# Patient Record
Sex: FEMALE | Race: WHITE | NOT HISPANIC OR LATINO | Employment: OTHER | URBAN - NONMETROPOLITAN AREA
[De-identification: names, ages, dates, MRNs, and addresses within clinical notes are randomized per-mention and may not be internally consistent; named-entity substitution may affect disease eponyms.]

---

## 2017-12-13 ENCOUNTER — TRANSCRIBE ORDERS (OUTPATIENT)
Dept: GENERAL RADIOLOGY | Facility: CLINIC | Age: 82
End: 2017-12-13

## 2017-12-13 DIAGNOSIS — R09.89 OTHER SPECIFIED SYMPTOMS AND SIGNS INVOLVING THE CIRCULATORY AND RESPIRATORY SYSTEMS: Primary | ICD-10-CM

## 2018-02-12 ENCOUNTER — OFFICE VISIT (OUTPATIENT)
Dept: CARDIAC SURGERY | Facility: CLINIC | Age: 83
End: 2018-02-12

## 2018-02-12 ENCOUNTER — LAB (OUTPATIENT)
Dept: LAB | Facility: OTHER | Age: 83
End: 2018-02-12

## 2018-02-12 VITALS
WEIGHT: 113.4 LBS | BODY MASS INDEX: 19.36 KG/M2 | OXYGEN SATURATION: 98 % | HEIGHT: 64 IN | HEART RATE: 99 BPM | DIASTOLIC BLOOD PRESSURE: 84 MMHG | SYSTOLIC BLOOD PRESSURE: 181 MMHG

## 2018-02-12 DIAGNOSIS — I65.23 BILATERAL CAROTID ARTERY STENOSIS: ICD-10-CM

## 2018-02-12 DIAGNOSIS — I10 ESSENTIAL HYPERTENSION: ICD-10-CM

## 2018-02-12 DIAGNOSIS — E78.2 MIXED HYPERLIPIDEMIA: ICD-10-CM

## 2018-02-12 DIAGNOSIS — G45.1 HEMISPHERIC CAROTID ARTERY SYNDROME: ICD-10-CM

## 2018-02-12 DIAGNOSIS — I65.23 BILATERAL CAROTID ARTERY STENOSIS: Primary | ICD-10-CM

## 2018-02-12 DIAGNOSIS — G25.81 RESTLESS LEGS: ICD-10-CM

## 2018-02-12 PROBLEM — E78.5 HYPERLIPIDEMIA: Status: ACTIVE | Noted: 2018-02-12

## 2018-02-12 LAB
ANION GAP SERPL CALCULATED.3IONS-SCNC: 9 MMOL/L (ref 5–15)
BUN BLD-MCNC: 15 MG/DL (ref 8–25)
BUN/CREAT SERPL: 21.4 (ref 7–25)
CALCIUM SPEC-SCNC: 9.6 MG/DL (ref 8.4–10.8)
CHLORIDE SERPL-SCNC: 107 MMOL/L (ref 100–112)
CO2 SERPL-SCNC: 27 MMOL/L (ref 20–32)
CREAT BLD-MCNC: 0.7 MG/DL (ref 0.4–1.3)
GFR SERPL CREATININE-BSD FRML MDRD: 79 ML/MIN/1.73 (ref 39–90)
GLUCOSE BLD-MCNC: 137 MG/DL (ref 70–100)
POTASSIUM BLD-SCNC: 4.1 MMOL/L (ref 3.4–5.4)
SODIUM BLD-SCNC: 143 MMOL/L (ref 134–146)

## 2018-02-12 PROCEDURE — 36415 COLL VENOUS BLD VENIPUNCTURE: CPT | Performed by: THORACIC SURGERY (CARDIOTHORACIC VASCULAR SURGERY)

## 2018-02-12 PROCEDURE — 99204 OFFICE O/P NEW MOD 45 MIN: CPT | Performed by: THORACIC SURGERY (CARDIOTHORACIC VASCULAR SURGERY)

## 2018-02-12 PROCEDURE — 80048 BASIC METABOLIC PNL TOTAL CA: CPT | Performed by: THORACIC SURGERY (CARDIOTHORACIC VASCULAR SURGERY)

## 2018-02-12 RX ORDER — PRAVASTATIN SODIUM 40 MG
40 TABLET ORAL DAILY
COMMUNITY

## 2018-02-12 RX ORDER — LATANOPROST 50 UG/ML
1 SOLUTION/ DROPS OPHTHALMIC NIGHTLY
COMMUNITY

## 2018-02-12 RX ORDER — METOPROLOL SUCCINATE 25 MG/1
25 TABLET, EXTENDED RELEASE ORAL DAILY
COMMUNITY

## 2018-02-12 RX ORDER — AMLODIPINE BESYLATE 5 MG/1
5 TABLET ORAL DAILY
COMMUNITY

## 2018-02-12 RX ORDER — BRIMONIDINE TARTRATE AND TIMOLOL MALEATE 2; 5 MG/ML; MG/ML
1 SOLUTION OPHTHALMIC EVERY 12 HOURS
COMMUNITY

## 2018-02-12 NOTE — PROGRESS NOTES
2/12/2018    Irais Barker  12/14/1931    Chief Complaint:    Chief Complaint   Patient presents with   • Carotid Artery Disease       HPI:      PCP:  MADELIN Hernandez    86 y.o. female with HTN(uncontrolled, increased risk stroke), Hyperlipidemia(stable, increased risk cardiovascular events), Carotid Stenosis(new, increased risk stroke), Vitamin D deficiency(stable, increased risk cardiovascular events).  never smoked.  Brief episodes moderate to severe slurred speech x3 lasting few minutes, 2015 and around thanksgiving. Bruit noted on exam with primary care. Vision changes, blurry with cornea and glaucoma.  History stroke mother, father.  No stroke amaurosis.  No MI claudication. No other associated signs, symptoms or modifying factors.    8/2015 CXR:  Mild LEFT lower lobe atelectasis  1/3/2018 Carotid Duplex:  RUDY 70-99% (261/65cm/s, ratio 2.7) antegrade vert.  LICA 50-69% (0-49% by SRU criteria) (114/27cm/s, ratio 1.1)    The following portions of the patient's history were reviewed and updated as appropriate: allergies, current medications, past family history, past medical history, past social history, past surgical history and problem list.  Recent images independently reviewed.  Available laboratory values reviewed.    PMH:  Past Medical History:   Diagnosis Date   • Arthropathy    • Degeneration of macula and posterior pole of retina    • Herpes zoster    • Hyperlipidemia    • Hypertension    • Osteopenia    • Restless legs        ALLERGIES:  Allergies   Allergen Reactions   • Naprosyn [Naproxen]          MEDICATIONS:    Current Outpatient Prescriptions:   •  amLODIPine (NORVASC) 5 MG tablet, Take 5 mg by mouth Daily., Disp: , Rfl:   •  brimonidine-timolol (COMBIGAN) 0.2-0.5 % ophthalmic solution, 1 drop Every 12 (Twelve) Hours., Disp: , Rfl:   •  latanoprost (XALATAN) 0.005 % ophthalmic solution, 1 drop Every Night., Disp: , Rfl:   •  metoprolol succinate XL (TOPROL-XL) 25 MG 24 hr tablet, Take 25 mg  by mouth Daily., Disp: , Rfl:   •  pravastatin (PRAVACHOL) 40 MG tablet, Take 40 mg by mouth Daily., Disp: , Rfl:     Review of Systems   Review of Systems   Constitution: Negative for malaise/fatigue, night sweats and weight loss.   HENT: Positive for hearing loss. Negative for hoarse voice and stridor.    Eyes: Positive for blurred vision and visual disturbance. Negative for vision loss in left eye and vision loss in right eye.   Cardiovascular: Negative for chest pain, leg swelling and palpitations.   Respiratory: Negative for cough, hemoptysis and shortness of breath.    Hematologic/Lymphatic: Negative for adenopathy and bleeding problem. Does not bruise/bleed easily.   Skin: Negative for color change, poor wound healing and rash.   Musculoskeletal: Positive for arthritis and joint pain. Negative for back pain, muscle weakness and neck pain.   Gastrointestinal: Negative for abdominal pain, dysphagia and heartburn.   Neurological: Negative for dizziness, numbness and seizures.        Slurring speech   Psychiatric/Behavioral: Negative for altered mental status, depression and memory loss. The patient is not nervous/anxious.        Physical Exam   Physical Exam   Constitutional: She is oriented to person, place, and time. She is active and cooperative. She does not appear ill. No distress.   HENT:   Head: Atraumatic.   Right Ear: Hearing normal.   Left Ear: Hearing normal.   Nose: No nasal deformity. No epistaxis.   Mouth/Throat: She does not have dentures. Normal dentition.   Eyes: Conjunctivae and lids are normal. Right pupil is round and reactive. Left pupil is round and reactive.   Neck: No JVD present. Carotid bruit is not present. No tracheal deviation present. No thyroid mass and no thyromegaly present.   Cardiovascular: Normal rate and regular rhythm.    No murmur heard.  Pulses:       Carotid pulses are 2+ on the right side with bruit, and 2+ on the left side.       Radial pulses are 2+ on the right side,  and 2+ on the left side.        Dorsalis pedis pulses are 2+ on the right side, and 2+ on the left side.   Pulmonary/Chest: Effort normal and breath sounds normal.   Abdominal: Soft. She exhibits no distension and no mass. There is no splenomegaly or hepatomegaly. There is no tenderness.   Musculoskeletal: She exhibits no deformity.   Gait normal.    Lymphadenopathy:     She has no cervical adenopathy.        Right: No supraclavicular adenopathy present.        Left: No supraclavicular adenopathy present.   Neurological: She is alert and oriented to person, place, and time. She has normal strength.   Skin: Skin is warm and dry. No cyanosis or erythema. No pallor.   No venous staining   Psychiatric: She has a normal mood and affect. Her speech is normal. Judgment and thought content normal.     Results for LETAANIBAL SERRANONASH SNIDER (MRN [id number]) as of 2/12/2018 16:41   Ref. Range 2/12/2018 15:16   Creatinine Latest Ref Range: 0.40 - 1.30 mg/dL 0.70   BUN Latest Ref Range: 8 - 25 mg/dL 15     ASSESSMENT:  Shira was seen today for carotid artery disease.    Diagnoses and all orders for this visit:    Bilateral carotid artery stenosis  -     CT Angiogram Carotids; Future  -     Basic Metabolic Panel; Future    Hemispheric carotid artery syndrome    Essential hypertension    Mixed hyperlipidemia    Restless legs    PLAN:  Detailed discussion with Shira Barker regarding situation and options.  New carotid stenosis, symptomatic.  Multiple risk factors with severe comorbidities.  Additional imaging required for evaluation location and extent of carotid stenosis.  Risks, benefits discussed.  Understands and wishes to proceed with plan.    Aspirin 81mg daily  CTA Carotids 2/19/2018    Return after above studies complete  Recommended regular physical activity, progressive walking program.  Continue current medications as directed.  Will Obtain relevant old records.    Thank you for the opportunity to participate in this  patient's care.    Copy to primary care provider.    EMR Dragon/Transcription disclaimer:   Much of this encounter note is an electronic transcription/translation of spoken language to printed text. The electronic translation of spoken language may permit erroneous, or at times, nonsensical words or phrases to be inadvertently transcribed; Although I have reviewed the note for such errors, some may still exist.

## 2018-02-19 ENCOUNTER — HOSPITAL ENCOUNTER (OUTPATIENT)
Dept: CT IMAGING | Facility: HOSPITAL | Age: 83
Discharge: HOME | End: 2018-02-19
Admitting: THORACIC SURGERY (CARDIOTHORACIC VASCULAR SURGERY)

## 2018-02-19 DIAGNOSIS — I65.23 BILATERAL CAROTID ARTERY STENOSIS: ICD-10-CM

## 2018-02-19 PROCEDURE — 0 IOPAMIDOL PER 1 ML: Performed by: THORACIC SURGERY (CARDIOTHORACIC VASCULAR SURGERY)

## 2018-02-19 PROCEDURE — 70498 CT ANGIOGRAPHY NECK: CPT

## 2018-02-19 RX ADMIN — IOPAMIDOL 65 ML: 755 INJECTION, SOLUTION INTRAVENOUS at 13:00

## 2018-03-26 ENCOUNTER — OFFICE VISIT (OUTPATIENT)
Dept: CARDIAC SURGERY | Facility: CLINIC | Age: 83
End: 2018-03-26

## 2018-03-26 VITALS
DIASTOLIC BLOOD PRESSURE: 82 MMHG | WEIGHT: 113.6 LBS | OXYGEN SATURATION: 98 % | HEIGHT: 63 IN | SYSTOLIC BLOOD PRESSURE: 152 MMHG | BODY MASS INDEX: 20.13 KG/M2 | HEART RATE: 94 BPM

## 2018-03-26 DIAGNOSIS — I65.23 BILATERAL CAROTID ARTERY STENOSIS: Primary | ICD-10-CM

## 2018-03-26 DIAGNOSIS — I10 ESSENTIAL HYPERTENSION: ICD-10-CM

## 2018-03-26 DIAGNOSIS — G45.1 HEMISPHERIC CAROTID ARTERY SYNDROME: ICD-10-CM

## 2018-03-26 DIAGNOSIS — I65.29 OCCLUSION AND STENOSIS OF CAROTID ARTERY: ICD-10-CM

## 2018-03-26 DIAGNOSIS — E78.2 MIXED HYPERLIPIDEMIA: ICD-10-CM

## 2018-03-26 PROCEDURE — 99214 OFFICE O/P EST MOD 30 MIN: CPT | Performed by: THORACIC SURGERY (CARDIOTHORACIC VASCULAR SURGERY)

## 2018-03-26 RX ORDER — SODIUM CHLORIDE 9 MG/ML
100 INJECTION, SOLUTION INTRAVENOUS CONTINUOUS
Status: CANCELLED | OUTPATIENT
Start: 2018-04-13

## 2018-03-26 RX ORDER — ASPIRIN 81 MG/1
81 TABLET, CHEWABLE ORAL DAILY
COMMUNITY

## 2018-04-06 NOTE — PROGRESS NOTES
3/26/2018    Irais Barker  12/14/1931    Chief Complaint:  Carotid Stenosis      HPI:      PCP:  Jordi Marcos, MADELIN  86 y.o. female with HTN(uncontrolled, increased risk stroke), Hyperlipidemia(stable, increased risk cardiovascular events), Carotid Stenosis(new, increased risk stroke), Vitamin D deficiency(stable, increased risk cardiovascular events).  never smoked.  Brief episodes moderate to severe slurred speech x3 lasting few minutes, 2015 and around thanksgiving. Bruit noted on exam with primary care. Vision changes, blurry with cornea and glaucoma.  History stroke mother, father.  No stroke amaurosis.  No MI claudication. No other associated signs, symptoms or modifying factors.     8/2015 CXR:  Mild LEFT lower lobe atelectasis  1/3/2018 Carotid Duplex:  RUDY 70-99% (261/65cm/s, ratio 2.7) antegrade vert.  LICA 50-69% (0-49% by SRU criteria) (114/27cm/s, ratio 1.1)  2/19/2018 CTA Carotids:  RUDY 70% with large ulcerated plaque, LICA mild plaque.    The following portions of the patient's history were reviewed and updated as appropriate: allergies, current medications, past family history, past medical history, past social history, past surgical history and problem list.  Recent images independently reviewed.  Available laboratory values reviewed.    PMH:  Past Medical History:   Diagnosis Date   • Arthropathy    • Degeneration of macula and posterior pole of retina    • Herpes zoster    • Hyperlipidemia    • Hypertension    • Osteopenia    • Restless legs        ALLERGIES:  Allergies   Allergen Reactions   • Naprosyn [Naproxen]          MEDICATIONS:    Current Outpatient Prescriptions:   •  amLODIPine (NORVASC) 5 MG tablet, Take 5 mg by mouth Daily., Disp: , Rfl:   •  aspirin 81 MG chewable tablet, Chew 81 mg Daily., Disp: , Rfl:   •  brimonidine-timolol (COMBIGAN) 0.2-0.5 % ophthalmic solution, 1 drop Every 12 (Twelve) Hours., Disp: , Rfl:   •  latanoprost (XALATAN) 0.005 % ophthalmic solution, 1 drop  Every Night., Disp: , Rfl:   •  metoprolol succinate XL (TOPROL-XL) 25 MG 24 hr tablet, Take 25 mg by mouth Daily., Disp: , Rfl:   •  pravastatin (PRAVACHOL) 40 MG tablet, Take 40 mg by mouth Daily., Disp: , Rfl:     Review of Systems   Review of Systems   Constitution: Negative for weakness, malaise/fatigue and weight loss.   HENT: Positive for hearing loss.    Eyes: Positive for blurred vision and visual disturbance.   Cardiovascular: Negative for chest pain, claudication and dyspnea on exertion.   Respiratory: Negative for cough and shortness of breath.    Skin: Negative for color change and poor wound healing.   Musculoskeletal: Positive for arthritis and joint pain.   Neurological: Negative for dizziness and numbness.       Physical Exam   Physical Exam   Constitutional: She is oriented to person, place, and time. She is active and cooperative. She does not appear ill. No distress.   HENT:   Right Ear: Hearing normal.   Left Ear: Hearing normal.   Nose: No nasal deformity. No epistaxis.   Mouth/Throat: She does not have dentures. Normal dentition.   Cardiovascular: Normal rate and regular rhythm.    No murmur heard.  Pulses:       Carotid pulses are 2+ on the right side with bruit, and 2+ on the left side.       Radial pulses are 2+ on the right side, and 2+ on the left side.        Dorsalis pedis pulses are 2+ on the right side, and 2+ on the left side.   Pulmonary/Chest: Effort normal and breath sounds normal.   Abdominal: Soft. She exhibits no distension and no mass. There is no tenderness.   Musculoskeletal: She exhibits no deformity.   Gait normal.    Neurological: She is alert and oriented to person, place, and time. She has normal strength.   Skin: Skin is warm and dry. No cyanosis or erythema. No pallor.   No venous staining   Psychiatric: She has a normal mood and affect. Her speech is normal. Judgment and thought content normal.     Results for SHIRA GILLETTE (MRN [id number]) as of 4/6/2018  09:22   Ref. Range 2/12/2018 15:16   Creatinine Latest Ref Range: 0.40 - 1.30 mg/dL 0.70   BUN Latest Ref Range: 8 - 25 mg/dL 15     ASSESSMENT:  Irais was seen today for carotid artery disease.    Diagnoses and all orders for this visit:    Bilateral carotid artery stenosis    Occlusion and stenosis of carotid artery  -     Case Request; Standing  -     Type and screen; Future  -     sodium chloride 0.9 % infusion; Infuse 100 mL/hr into a venous catheter Continuous.  -     ceFAZolin (ANCEF) 2 g in sodium chloride 0.9 % 100 mL IVPB; Infuse 2 g into a venous catheter 1 (One) Time.  -     Case Request    Essential hypertension    Mixed hyperlipidemia    Hemispheric carotid artery syndrome    Other orders  -     Inpatient Admission; Standing  -     Provide NPO Instructions to Patient; Future  -     Clorhexidine Skin Prep; Future  -     Obtain informed consent; Standing  -     Chlorhexidine 4%/Hibiclens Skin Prep; Standing  -     Insert Arterial Line; Standing  -     Place sequential compression device; Standing    PLAN:  Detailed discussion with Irais Barker regarding situation, options and plans.  severe, symptomatic carotid stenosis.  RUDY with large ulcerated soft plaque. Carotid intervention is advisable.  Increased risk for Stroke and cardiovascular complications.  Carotid  Endarterectomy is advisable.    Risks, including but not limited to:  Mortality, major morbidity 1%.  Stroke risk 3-5%.  bleeding, transfusion, infection, pulmonary, renal dysfunction, blood vessel and nerve injury.  Benefits:  reduction of stroke risk  Options: carotid endarterectomy, stenting and medical therapy discussed.   usually overnight stay in hospital if all well. Understands and wishes to proceed.    RIGHT Carotid endarterectomy with patch, completion arteriogram, radial arterial line,  Ancef. GEN  SDS 4/13/2018    Return after above studies complete  Recommended regular physical activity, progressive walking  program.  Continue current medications as directed.    Thank you for the opportunity to participate in this patient's care.    Copy to primary care provider.    EMR Dragon/Transcription disclaimer:   Much of this encounter note is an electronic transcription/translation of spoken language to printed text. The electronic translation of spoken language may permit erroneous, or at times, nonsensical words or phrases to be inadvertently transcribed; Although I have reviewed the note for such errors, some may still exist.

## 2018-04-10 ENCOUNTER — APPOINTMENT (OUTPATIENT)
Dept: PREADMISSION TESTING | Facility: HOSPITAL | Age: 83
End: 2018-04-10

## 2018-04-10 VITALS
HEIGHT: 62 IN | BODY MASS INDEX: 20.98 KG/M2 | RESPIRATION RATE: 14 BRPM | SYSTOLIC BLOOD PRESSURE: 170 MMHG | HEART RATE: 68 BPM | WEIGHT: 114 LBS | DIASTOLIC BLOOD PRESSURE: 64 MMHG | OXYGEN SATURATION: 99 %

## 2018-04-10 DIAGNOSIS — I65.29 OCCLUSION AND STENOSIS OF CAROTID ARTERY: ICD-10-CM

## 2018-04-10 LAB
ABO GROUP BLD: NORMAL
ANION GAP SERPL CALCULATED.3IONS-SCNC: 18 MMOL/L (ref 5–15)
BASOPHILS # BLD AUTO: 0.02 10*3/MM3 (ref 0–0.2)
BASOPHILS NFR BLD AUTO: 0.3 % (ref 0–2)
BLD GP AB SCN SERPL QL: NEGATIVE
BUN BLD-MCNC: 15 MG/DL (ref 7–21)
BUN/CREAT SERPL: 23.1 (ref 7–25)
CALCIUM SPEC-SCNC: 10 MG/DL (ref 8.4–10.2)
CHLORIDE SERPL-SCNC: 100 MMOL/L (ref 95–110)
CO2 SERPL-SCNC: 27 MMOL/L (ref 22–31)
CREAT BLD-MCNC: 0.65 MG/DL (ref 0.5–1)
DEPRECATED RDW RBC AUTO: 40.4 FL (ref 36.4–46.3)
EOSINOPHIL # BLD AUTO: 0.13 10*3/MM3 (ref 0–0.7)
EOSINOPHIL NFR BLD AUTO: 2.2 % (ref 0–7)
ERYTHROCYTE [DISTWIDTH] IN BLOOD BY AUTOMATED COUNT: 12.3 % (ref 11.5–14.5)
GFR SERPL CREATININE-BSD FRML MDRD: 86 ML/MIN/1.73 (ref 39–90)
GLUCOSE BLD-MCNC: 102 MG/DL (ref 60–100)
HCT VFR BLD AUTO: 40 % (ref 35–45)
HGB BLD-MCNC: 13.9 G/DL (ref 12–15.5)
IMM GRANULOCYTES # BLD: 0.01 10*3/MM3 (ref 0–0.02)
IMM GRANULOCYTES NFR BLD: 0.2 % (ref 0–0.5)
LYMPHOCYTES # BLD AUTO: 1.83 10*3/MM3 (ref 0.6–4.2)
LYMPHOCYTES NFR BLD AUTO: 30.6 % (ref 10–50)
Lab: NORMAL
MCH RBC QN AUTO: 31.4 PG (ref 26.5–34)
MCHC RBC AUTO-ENTMCNC: 34.8 G/DL (ref 31.4–36)
MCV RBC AUTO: 90.3 FL (ref 80–98)
MONOCYTES # BLD AUTO: 0.39 10*3/MM3 (ref 0–0.9)
MONOCYTES NFR BLD AUTO: 6.5 % (ref 0–12)
NEUTROPHILS # BLD AUTO: 3.61 10*3/MM3 (ref 2–8.6)
NEUTROPHILS NFR BLD AUTO: 60.2 % (ref 37–80)
PLATELET # BLD AUTO: 191 10*3/MM3 (ref 150–450)
PMV BLD AUTO: 10.8 FL (ref 8–12)
POTASSIUM BLD-SCNC: 4.3 MMOL/L (ref 3.5–5.1)
RBC # BLD AUTO: 4.43 10*6/MM3 (ref 3.77–5.16)
RH BLD: POSITIVE
SODIUM BLD-SCNC: 145 MMOL/L (ref 137–145)
T&S EXPIRATION DATE: NORMAL
WBC NRBC COR # BLD: 5.99 10*3/MM3 (ref 3.2–9.8)

## 2018-04-10 PROCEDURE — 93005 ELECTROCARDIOGRAM TRACING: CPT

## 2018-04-10 PROCEDURE — 86850 RBC ANTIBODY SCREEN: CPT | Performed by: THORACIC SURGERY (CARDIOTHORACIC VASCULAR SURGERY)

## 2018-04-10 PROCEDURE — 36415 COLL VENOUS BLD VENIPUNCTURE: CPT

## 2018-04-10 PROCEDURE — 86900 BLOOD TYPING SEROLOGIC ABO: CPT | Performed by: THORACIC SURGERY (CARDIOTHORACIC VASCULAR SURGERY)

## 2018-04-10 PROCEDURE — 86901 BLOOD TYPING SEROLOGIC RH(D): CPT | Performed by: THORACIC SURGERY (CARDIOTHORACIC VASCULAR SURGERY)

## 2018-04-10 PROCEDURE — 93010 ELECTROCARDIOGRAM REPORT: CPT | Performed by: INTERNAL MEDICINE

## 2018-04-10 PROCEDURE — 85025 COMPLETE CBC W/AUTO DIFF WBC: CPT | Performed by: ANESTHESIOLOGY

## 2018-04-10 PROCEDURE — 80048 BASIC METABOLIC PNL TOTAL CA: CPT | Performed by: ANESTHESIOLOGY

## 2018-04-10 NOTE — DISCHARGE INSTRUCTIONS
[facility]  Pre-op Information and Guidelines    You will be called after 2 p.m. the day before your surgery (Friday for Monday surgery) and notified of your time for arrival and approximate surgery time.  If you have not received a call by 4P.M., please contact Same Day Surgery at [phone] of if outside [facility] call [phone].    Please Follow these Important Safety Guidelines:    • The morning of your procedure, take only the medications listed below with   A sip of water:__METOPROLOL, AMLODIPINE, EYE DROPS________       ______________________________________________    • DO NOT eat or drink anything after 12:00 midnight the night before surgery  Specific instructions concerning drinking clear liquids will be discussed during  the pre-surgery instruction call the day before your surgery.    • If you take a blood thinner (ex. Plavix, Coumadin, aspirin), ask your doctor when to stop it before surgery  STOP DATE: _________________    • Only 2 visitors are allowed in patient rooms at a time  Your visitors will be asked to wait in the lobby until the admission process is complete with the exception of a parent with a child and patients in need of special assistance.    • YOU CANNOT DRIVE YOURSELF HOME  You must be accompanied by someone who will be responsible for driving you home after surgery and for your care at home.    • DO NOT chew gum, use breath mints, hard candy, or smoke the day of surgery  • DO NOT drink alcohol for at least 24 hours before your surgery  • DO NOT wear any jewelry and remove all body piercing before coming to the hospital  • DO NOT wear make-up to the hospital  • If you are having surgery on an extremity (arm/leg/foot) remove nail polish/artificial nails on the surgical side  • Clothing, glasses, contacts, dentures, and hairpieces must be removed before surgery  • Bathe the night before or the morning of your surgery and do not use powders/lotions on  skin.

## 2018-04-12 ENCOUNTER — ANESTHESIA EVENT (OUTPATIENT)
Dept: PERIOP | Facility: HOSPITAL | Age: 83
End: 2018-04-12

## 2018-04-13 ENCOUNTER — ANESTHESIA (OUTPATIENT)
Dept: PERIOP | Facility: HOSPITAL | Age: 83
End: 2018-04-13

## 2018-04-13 ENCOUNTER — APPOINTMENT (OUTPATIENT)
Dept: GENERAL RADIOLOGY | Facility: HOSPITAL | Age: 83
End: 2018-04-13

## 2018-04-13 ENCOUNTER — HOSPITAL ENCOUNTER (INPATIENT)
Facility: HOSPITAL | Age: 83
LOS: 1 days | Discharge: HOME | End: 2018-04-14
Attending: THORACIC SURGERY (CARDIOTHORACIC VASCULAR SURGERY) | Admitting: THORACIC SURGERY (CARDIOTHORACIC VASCULAR SURGERY)

## 2018-04-13 DIAGNOSIS — I65.29 OCCLUSION AND STENOSIS OF CAROTID ARTERY: ICD-10-CM

## 2018-04-13 LAB
ABO GROUP BLD: NORMAL
BLD GP AB SCN SERPL QL: NEGATIVE
Lab: NORMAL
RH BLD: POSITIVE
T&S EXPIRATION DATE: NORMAL

## 2018-04-13 PROCEDURE — 25010000003 CEFAZOLIN PER 500 MG: Performed by: THORACIC SURGERY (CARDIOTHORACIC VASCULAR SURGERY)

## 2018-04-13 PROCEDURE — 25010000002 PROTAMINE SULFATE PER 10 MG: Performed by: NURSE ANESTHETIST, CERTIFIED REGISTERED

## 2018-04-13 PROCEDURE — 76000 FLUOROSCOPY <1 HR PHYS/QHP: CPT

## 2018-04-13 PROCEDURE — 25010000002 PROPOFOL 10 MG/ML EMULSION: Performed by: NURSE ANESTHETIST, CERTIFIED REGISTERED

## 2018-04-13 PROCEDURE — 25010000002 KETOROLAC TROMETHAMINE PER 15 MG: Performed by: THORACIC SURGERY (CARDIOTHORACIC VASCULAR SURGERY)

## 2018-04-13 PROCEDURE — 25010000002 NEOSTIGMINE PER 0.5 MG: Performed by: NURSE ANESTHETIST, CERTIFIED REGISTERED

## 2018-04-13 PROCEDURE — 25010000002 ONDANSETRON PER 1 MG: Performed by: NURSE ANESTHETIST, CERTIFIED REGISTERED

## 2018-04-13 PROCEDURE — 88311 DECALCIFY TISSUE: CPT | Performed by: THORACIC SURGERY (CARDIOTHORACIC VASCULAR SURGERY)

## 2018-04-13 PROCEDURE — 25010000002 FENTANYL CITRATE (PF) 100 MCG/2ML SOLUTION: Performed by: NURSE ANESTHETIST, CERTIFIED REGISTERED

## 2018-04-13 PROCEDURE — 25010000002 DEXAMETHASONE PER 1 MG: Performed by: NURSE ANESTHETIST, CERTIFIED REGISTERED

## 2018-04-13 PROCEDURE — 86850 RBC ANTIBODY SCREEN: CPT | Performed by: ANESTHESIOLOGY

## 2018-04-13 PROCEDURE — 25010000002 HEPARIN (PORCINE) PER 1000 UNITS: Performed by: NURSE ANESTHETIST, CERTIFIED REGISTERED

## 2018-04-13 PROCEDURE — 03CK0ZZ EXTIRPATION OF MATTER FROM RIGHT INTERNAL CAROTID ARTERY, OPEN APPROACH: ICD-10-PCS | Performed by: THORACIC SURGERY (CARDIOTHORACIC VASCULAR SURGERY)

## 2018-04-13 PROCEDURE — 03CM0ZZ EXTIRPATION OF MATTER FROM RIGHT EXTERNAL CAROTID ARTERY, OPEN APPROACH: ICD-10-PCS | Performed by: THORACIC SURGERY (CARDIOTHORACIC VASCULAR SURGERY)

## 2018-04-13 PROCEDURE — 25010000002 HEPARIN (PORCINE) PER 1000 UNITS: Performed by: THORACIC SURGERY (CARDIOTHORACIC VASCULAR SURGERY)

## 2018-04-13 PROCEDURE — 88304 TISSUE EXAM BY PATHOLOGIST: CPT | Performed by: PATHOLOGY

## 2018-04-13 PROCEDURE — 25010000002 IOPAMIDOL 61 % SOLUTION: Performed by: THORACIC SURGERY (CARDIOTHORACIC VASCULAR SURGERY)

## 2018-04-13 PROCEDURE — 86901 BLOOD TYPING SEROLOGIC RH(D): CPT | Performed by: ANESTHESIOLOGY

## 2018-04-13 PROCEDURE — 88304 TISSUE EXAM BY PATHOLOGIST: CPT | Performed by: THORACIC SURGERY (CARDIOTHORACIC VASCULAR SURGERY)

## 2018-04-13 PROCEDURE — 25010000002 MIDAZOLAM PER 1 MG: Performed by: NURSE ANESTHETIST, CERTIFIED REGISTERED

## 2018-04-13 PROCEDURE — 88311 DECALCIFY TISSUE: CPT | Performed by: PATHOLOGY

## 2018-04-13 PROCEDURE — 86900 BLOOD TYPING SEROLOGIC ABO: CPT | Performed by: ANESTHESIOLOGY

## 2018-04-13 PROCEDURE — 25010000002 PHENYLEPHRINE PER 1 ML: Performed by: NURSE ANESTHETIST, CERTIFIED REGISTERED

## 2018-04-13 PROCEDURE — C1768 GRAFT, VASCULAR: HCPCS | Performed by: THORACIC SURGERY (CARDIOTHORACIC VASCULAR SURGERY)

## 2018-04-13 PROCEDURE — 35301 RECHANNELING OF ARTERY: CPT | Performed by: THORACIC SURGERY (CARDIOTHORACIC VASCULAR SURGERY)

## 2018-04-13 DEVICE — PTCH VASC XENOSURE BIOLOGIC 1X6CM: Type: IMPLANTABLE DEVICE | Site: CAROTID | Status: FUNCTIONAL

## 2018-04-13 RX ORDER — DEXAMETHASONE SODIUM PHOSPHATE 4 MG/ML
INJECTION, SOLUTION INTRA-ARTICULAR; INTRALESIONAL; INTRAMUSCULAR; INTRAVENOUS; SOFT TISSUE AS NEEDED
Status: DISCONTINUED | OUTPATIENT
Start: 2018-04-13 | End: 2018-04-13 | Stop reason: SURG

## 2018-04-13 RX ORDER — EPHEDRINE SULFATE 50 MG/ML
INJECTION, SOLUTION INTRAVENOUS AS NEEDED
Status: DISCONTINUED | OUTPATIENT
Start: 2018-04-13 | End: 2018-04-13 | Stop reason: SURG

## 2018-04-13 RX ORDER — LIDOCAINE HYDROCHLORIDE 10 MG/ML
INJECTION, SOLUTION INFILTRATION; PERINEURAL AS NEEDED
Status: DISCONTINUED | OUTPATIENT
Start: 2018-04-13 | End: 2018-04-14 | Stop reason: HOSPADM

## 2018-04-13 RX ORDER — GLYCOPYRROLATE 0.2 MG/ML
INJECTION INTRAMUSCULAR; INTRAVENOUS AS NEEDED
Status: DISCONTINUED | OUTPATIENT
Start: 2018-04-13 | End: 2018-04-13 | Stop reason: SURG

## 2018-04-13 RX ORDER — ACETAMINOPHEN 650 MG/1
650 SUPPOSITORY RECTAL ONCE AS NEEDED
Status: DISCONTINUED | OUTPATIENT
Start: 2018-04-13 | End: 2018-04-13 | Stop reason: HOSPADM

## 2018-04-13 RX ORDER — ONDANSETRON 4 MG/1
4 TABLET, ORALLY DISINTEGRATING ORAL EVERY 6 HOURS PRN
Status: DISCONTINUED | OUTPATIENT
Start: 2018-04-13 | End: 2018-04-14 | Stop reason: HOSPADM

## 2018-04-13 RX ORDER — PROPOFOL 10 MG/ML
VIAL (ML) INTRAVENOUS AS NEEDED
Status: DISCONTINUED | OUTPATIENT
Start: 2018-04-13 | End: 2018-04-13 | Stop reason: SURG

## 2018-04-13 RX ORDER — LABETALOL HYDROCHLORIDE 5 MG/ML
5 INJECTION, SOLUTION INTRAVENOUS
Status: DISCONTINUED | OUTPATIENT
Start: 2018-04-13 | End: 2018-04-13 | Stop reason: HOSPADM

## 2018-04-13 RX ORDER — FENTANYL CITRATE 50 UG/ML
INJECTION, SOLUTION INTRAMUSCULAR; INTRAVENOUS AS NEEDED
Status: DISCONTINUED | OUTPATIENT
Start: 2018-04-13 | End: 2018-04-13 | Stop reason: SURG

## 2018-04-13 RX ORDER — MEPERIDINE HYDROCHLORIDE 50 MG/ML
12.5 INJECTION INTRAMUSCULAR; INTRAVENOUS; SUBCUTANEOUS
Status: DISCONTINUED | OUTPATIENT
Start: 2018-04-13 | End: 2018-04-13 | Stop reason: HOSPADM

## 2018-04-13 RX ORDER — AMLODIPINE BESYLATE 5 MG/1
5 TABLET ORAL DAILY
Status: DISCONTINUED | OUTPATIENT
Start: 2018-04-14 | End: 2018-04-14 | Stop reason: HOSPADM

## 2018-04-13 RX ORDER — MIDAZOLAM HYDROCHLORIDE 1 MG/ML
INJECTION INTRAMUSCULAR; INTRAVENOUS AS NEEDED
Status: DISCONTINUED | OUTPATIENT
Start: 2018-04-13 | End: 2018-04-13 | Stop reason: SURG

## 2018-04-13 RX ORDER — ONDANSETRON 4 MG/1
4 TABLET, FILM COATED ORAL EVERY 6 HOURS PRN
Status: DISCONTINUED | OUTPATIENT
Start: 2018-04-13 | End: 2018-04-14 | Stop reason: HOSPADM

## 2018-04-13 RX ORDER — DEXTROSE AND SODIUM CHLORIDE 5; .45 G/100ML; G/100ML
75 INJECTION, SOLUTION INTRAVENOUS CONTINUOUS
Status: DISCONTINUED | OUTPATIENT
Start: 2018-04-13 | End: 2018-04-14 | Stop reason: HOSPADM

## 2018-04-13 RX ORDER — DIPHENHYDRAMINE HYDROCHLORIDE 50 MG/ML
12.5 INJECTION INTRAMUSCULAR; INTRAVENOUS
Status: DISCONTINUED | OUTPATIENT
Start: 2018-04-13 | End: 2018-04-13 | Stop reason: HOSPADM

## 2018-04-13 RX ORDER — SODIUM CHLORIDE, SODIUM GLUCONATE, SODIUM ACETATE, POTASSIUM CHLORIDE, AND MAGNESIUM CHLORIDE 526; 502; 368; 37; 30 MG/100ML; MG/100ML; MG/100ML; MG/100ML; MG/100ML
INJECTION, SOLUTION INTRAVENOUS CONTINUOUS PRN
Status: DISCONTINUED | OUTPATIENT
Start: 2018-04-13 | End: 2018-04-13 | Stop reason: SURG

## 2018-04-13 RX ORDER — BACITRACIN 50000 [IU]/1
INJECTION, POWDER, FOR SOLUTION INTRAMUSCULAR AS NEEDED
Status: DISCONTINUED | OUTPATIENT
Start: 2018-04-13 | End: 2018-04-14 | Stop reason: HOSPADM

## 2018-04-13 RX ORDER — EPHEDRINE SULFATE 50 MG/ML
5 INJECTION, SOLUTION INTRAVENOUS ONCE AS NEEDED
Status: DISCONTINUED | OUTPATIENT
Start: 2018-04-13 | End: 2018-04-13 | Stop reason: HOSPADM

## 2018-04-13 RX ORDER — VECURONIUM BROMIDE 20 MG/20ML
INJECTION, POWDER, LYOPHILIZED, FOR SOLUTION INTRAVENOUS AS NEEDED
Status: DISCONTINUED | OUTPATIENT
Start: 2018-04-13 | End: 2018-04-13 | Stop reason: SURG

## 2018-04-13 RX ORDER — METOPROLOL SUCCINATE 25 MG/1
25 TABLET, EXTENDED RELEASE ORAL DAILY
Status: DISCONTINUED | OUTPATIENT
Start: 2018-04-13 | End: 2018-04-14 | Stop reason: HOSPADM

## 2018-04-13 RX ORDER — HYDROCODONE BITARTRATE AND ACETAMINOPHEN 5; 325 MG/1; MG/1
1 TABLET ORAL EVERY 4 HOURS PRN
Status: DISCONTINUED | OUTPATIENT
Start: 2018-04-13 | End: 2018-04-14 | Stop reason: HOSPADM

## 2018-04-13 RX ORDER — ONDANSETRON 2 MG/ML
INJECTION INTRAMUSCULAR; INTRAVENOUS AS NEEDED
Status: DISCONTINUED | OUTPATIENT
Start: 2018-04-13 | End: 2018-04-13 | Stop reason: SURG

## 2018-04-13 RX ORDER — ONDANSETRON 2 MG/ML
4 INJECTION INTRAMUSCULAR; INTRAVENOUS EVERY 6 HOURS PRN
Status: DISCONTINUED | OUTPATIENT
Start: 2018-04-13 | End: 2018-04-14 | Stop reason: HOSPADM

## 2018-04-13 RX ORDER — NITROGLYCERIN 20 MG/100ML
5-200 INJECTION INTRAVENOUS
Status: DISCONTINUED | OUTPATIENT
Start: 2018-04-13 | End: 2018-04-14 | Stop reason: HOSPADM

## 2018-04-13 RX ORDER — SODIUM CHLORIDE 9 MG/ML
100 INJECTION, SOLUTION INTRAVENOUS CONTINUOUS
Status: DISCONTINUED | OUTPATIENT
Start: 2018-04-13 | End: 2018-04-13 | Stop reason: HOSPADM

## 2018-04-13 RX ORDER — ONDANSETRON 2 MG/ML
4 INJECTION INTRAMUSCULAR; INTRAVENOUS ONCE AS NEEDED
Status: DISCONTINUED | OUTPATIENT
Start: 2018-04-13 | End: 2018-04-13 | Stop reason: HOSPADM

## 2018-04-13 RX ORDER — HEPARIN SODIUM 1000 [USP'U]/ML
INJECTION, SOLUTION INTRAVENOUS; SUBCUTANEOUS AS NEEDED
Status: DISCONTINUED | OUTPATIENT
Start: 2018-04-13 | End: 2018-04-13 | Stop reason: SURG

## 2018-04-13 RX ORDER — KETOROLAC TROMETHAMINE 15 MG/ML
15 INJECTION, SOLUTION INTRAMUSCULAR; INTRAVENOUS ONCE
Status: COMPLETED | OUTPATIENT
Start: 2018-04-13 | End: 2018-04-13

## 2018-04-13 RX ORDER — ALBUTEROL SULFATE 2.5 MG/3ML
2.5 SOLUTION RESPIRATORY (INHALATION)
Status: DISCONTINUED | OUTPATIENT
Start: 2018-04-13 | End: 2018-04-14 | Stop reason: HOSPADM

## 2018-04-13 RX ORDER — ATORVASTATIN CALCIUM 20 MG/1
20 TABLET, FILM COATED ORAL DAILY
Status: DISCONTINUED | OUTPATIENT
Start: 2018-04-13 | End: 2018-04-14 | Stop reason: HOSPADM

## 2018-04-13 RX ORDER — BISACODYL 10 MG
10 SUPPOSITORY, RECTAL RECTAL DAILY PRN
Status: DISCONTINUED | OUTPATIENT
Start: 2018-04-13 | End: 2018-04-14 | Stop reason: HOSPADM

## 2018-04-13 RX ORDER — SENNA AND DOCUSATE SODIUM 50; 8.6 MG/1; MG/1
2 TABLET, FILM COATED ORAL 2 TIMES DAILY PRN
Status: DISCONTINUED | OUTPATIENT
Start: 2018-04-13 | End: 2018-04-14 | Stop reason: HOSPADM

## 2018-04-13 RX ORDER — ASPIRIN 81 MG/1
81 TABLET, CHEWABLE ORAL DAILY
Status: DISCONTINUED | OUTPATIENT
Start: 2018-04-13 | End: 2018-04-14 | Stop reason: HOSPADM

## 2018-04-13 RX ORDER — PROTAMINE SULFATE 10 MG/ML
INJECTION, SOLUTION INTRAVENOUS AS NEEDED
Status: DISCONTINUED | OUTPATIENT
Start: 2018-04-13 | End: 2018-04-13 | Stop reason: SURG

## 2018-04-13 RX ORDER — ACETAMINOPHEN 325 MG/1
650 TABLET ORAL ONCE AS NEEDED
Status: DISCONTINUED | OUTPATIENT
Start: 2018-04-13 | End: 2018-04-13 | Stop reason: HOSPADM

## 2018-04-13 RX ORDER — LATANOPROST 50 UG/ML
1 SOLUTION/ DROPS OPHTHALMIC NIGHTLY
Status: DISCONTINUED | OUTPATIENT
Start: 2018-04-13 | End: 2018-04-14 | Stop reason: HOSPADM

## 2018-04-13 RX ORDER — HEPARIN SODIUM 5000 [USP'U]/ML
INJECTION, SOLUTION INTRAVENOUS; SUBCUTANEOUS AS NEEDED
Status: DISCONTINUED | OUTPATIENT
Start: 2018-04-13 | End: 2018-04-14 | Stop reason: HOSPADM

## 2018-04-13 RX ORDER — NALOXONE HCL 0.4 MG/ML
0.2 VIAL (ML) INJECTION AS NEEDED
Status: DISCONTINUED | OUTPATIENT
Start: 2018-04-13 | End: 2018-04-13 | Stop reason: HOSPADM

## 2018-04-13 RX ORDER — ACETAMINOPHEN 325 MG/1
650 TABLET ORAL EVERY 4 HOURS PRN
Status: DISCONTINUED | OUTPATIENT
Start: 2018-04-13 | End: 2018-04-14 | Stop reason: HOSPADM

## 2018-04-13 RX ORDER — FLUMAZENIL 0.1 MG/ML
0.2 INJECTION INTRAVENOUS AS NEEDED
Status: DISCONTINUED | OUTPATIENT
Start: 2018-04-13 | End: 2018-04-13 | Stop reason: HOSPADM

## 2018-04-13 RX ORDER — SODIUM CHLORIDE 9 MG/ML
INJECTION, SOLUTION INTRAVENOUS AS NEEDED
Status: DISCONTINUED | OUTPATIENT
Start: 2018-04-13 | End: 2018-04-14 | Stop reason: HOSPADM

## 2018-04-13 RX ADMIN — FENTANYL CITRATE 25 MCG: 50 INJECTION, SOLUTION INTRAMUSCULAR; INTRAVENOUS at 13:08

## 2018-04-13 RX ADMIN — LATANOPROST 1 DROP: 50 SOLUTION OPHTHALMIC at 20:07

## 2018-04-13 RX ADMIN — DEXTROSE AND SODIUM CHLORIDE 75 ML/HR: 5; 450 INJECTION, SOLUTION INTRAVENOUS at 17:30

## 2018-04-13 RX ADMIN — MIDAZOLAM 1 MG: 1 INJECTION INTRAMUSCULAR; INTRAVENOUS at 13:08

## 2018-04-13 RX ADMIN — PHENYLEPHRINE HYDROCHLORIDE 100 MCG: 10 INJECTION INTRAVENOUS at 14:56

## 2018-04-13 RX ADMIN — PROPOFOL 80 MG: 10 INJECTION, EMULSION INTRAVENOUS at 13:17

## 2018-04-13 RX ADMIN — MIDAZOLAM 0.5 MG: 1 INJECTION INTRAMUSCULAR; INTRAVENOUS at 12:53

## 2018-04-13 RX ADMIN — PHENYLEPHRINE HYDROCHLORIDE 100 MCG: 10 INJECTION INTRAVENOUS at 13:45

## 2018-04-13 RX ADMIN — VECURONIUM BROMIDE 3.5 MG: 1 INJECTION, POWDER, LYOPHILIZED, FOR SOLUTION INTRAVENOUS at 13:32

## 2018-04-13 RX ADMIN — GLYCOPYRROLATE 0.4 MG: 0.2 INJECTION, SOLUTION INTRAMUSCULAR; INTRAVENOUS at 15:15

## 2018-04-13 RX ADMIN — CEFAZOLIN SODIUM 2 G: 1 INJECTION, POWDER, FOR SOLUTION INTRAMUSCULAR; INTRAVENOUS at 13:23

## 2018-04-13 RX ADMIN — FENTANYL CITRATE 50 MCG: 50 INJECTION, SOLUTION INTRAMUSCULAR; INTRAVENOUS at 13:47

## 2018-04-13 RX ADMIN — FENTANYL CITRATE 25 MCG: 50 INJECTION, SOLUTION INTRAMUSCULAR; INTRAVENOUS at 13:17

## 2018-04-13 RX ADMIN — FENTANYL CITRATE 50 MCG: 50 INJECTION, SOLUTION INTRAMUSCULAR; INTRAVENOUS at 13:35

## 2018-04-13 RX ADMIN — EPHEDRINE SULFATE 10 MG: 50 INJECTION INTRAVENOUS at 14:09

## 2018-04-13 RX ADMIN — ONDANSETRON 4 MG: 2 INJECTION INTRAMUSCULAR; INTRAVENOUS at 15:15

## 2018-04-13 RX ADMIN — HEPARIN SODIUM 66 UNITS: 1000 INJECTION, SOLUTION INTRAVENOUS; SUBCUTANEOUS at 13:53

## 2018-04-13 RX ADMIN — PHENYLEPHRINE HYDROCHLORIDE 100 MCG: 10 INJECTION INTRAVENOUS at 14:02

## 2018-04-13 RX ADMIN — GLYCOPYRROLATE 0.2 MG: 0.2 INJECTION, SOLUTION INTRAMUSCULAR; INTRAVENOUS at 14:23

## 2018-04-13 RX ADMIN — PHENYLEPHRINE HYDROCHLORIDE 100 MCG: 10 INJECTION INTRAVENOUS at 14:28

## 2018-04-13 RX ADMIN — PROPOFOL 20 MG: 10 INJECTION, EMULSION INTRAVENOUS at 13:18

## 2018-04-13 RX ADMIN — PHENYLEPHRINE HYDROCHLORIDE 100 MCG: 10 INJECTION INTRAVENOUS at 14:45

## 2018-04-13 RX ADMIN — KETOROLAC TROMETHAMINE 15 MG: 15 INJECTION, SOLUTION INTRAMUSCULAR; INTRAVENOUS at 16:18

## 2018-04-13 RX ADMIN — DEXAMETHASONE SODIUM PHOSPHATE 4 MG: 4 INJECTION, SOLUTION INTRAMUSCULAR; INTRAVENOUS at 14:50

## 2018-04-13 RX ADMIN — CEFAZOLIN SODIUM 2 G: 1 INJECTION, POWDER, FOR SOLUTION INTRAMUSCULAR; INTRAVENOUS at 22:31

## 2018-04-13 RX ADMIN — PROTAMINE SULFATE 20 MG: 10 INJECTION, SOLUTION INTRAVENOUS at 14:59

## 2018-04-13 RX ADMIN — SODIUM CHLORIDE, SODIUM GLUCONATE, SODIUM ACETATE, POTASSIUM CHLORIDE, AND MAGNESIUM CHLORIDE: 526; 502; 368; 37; 30 INJECTION, SOLUTION INTRAVENOUS at 12:55

## 2018-04-13 RX ADMIN — SODIUM CHLORIDE 100 ML/HR: 9 INJECTION, SOLUTION INTRAVENOUS at 09:27

## 2018-04-13 RX ADMIN — Medication 2 MG: at 15:15

## 2018-04-13 RX ADMIN — MIDAZOLAM 0.5 MG: 1 INJECTION INTRAMUSCULAR; INTRAVENOUS at 13:00

## 2018-04-13 NOTE — OP NOTE
OPERATIVE NOTE  Irais Barker  12/14/1931 4/13/2018    PREOP DIAGNOSES:  Occlusion and stenosis of carotid artery [I65.29]    POSTOP DIAGNOSES:  Occlusion and stenosis of carotid artery [I65.29]    PROCEDURE:   RIGHT CAROTID ENDARTERECTOMY   carotid cerebral arteriogram          SURGEON: PILAR Randle MD FACS RPVI    ASSISTANT: Noe Bautista CFA    ANESTHESIA: General ET  General    ESTIMATED BLOOD LOSS: 30ml     COMPLICATIONS: none    DESCRIPTION OF OPERATION:   Patient taken to the operating room placed in supine position. general endotracheal anesthesia was induced. patient prepped draped sterile fashion. oblique incision made in the right neck dissection carried down to the common carotid artery and its branches which were isolated. total of 6,000 units of intravenous heparin given to maintain an ACT around 300. distal internal carotid artery was clamped [place] clamp proximal common carotid artery with water baby clamp, external isolated with vessel loop. superior thyroid artery isolated with 0 silk tie.  Common carotid artery was opened with 11 blade and Pineda scissors across the plaque into the normal distal internal carotid artery.  diffuse plaque in the proximal portion of the internal carotid artery.  There is 90% calcified plaque in the proximal internal carotid artery with moderate debris,  Irrigated clear with heparinized saline.  Shunt was placed distally with good backbleeding and proximally with good flow secured with vessel loops. carotid endarterectomy was performed with [place] elevator, eversion endarterectomy of the external carotid artery. proximal plaque was cut with Pineda scissors, distal plaque tapered nicely. loose debris was removed, irrigated with heparinized saline. pericardial patch was sewn in place with 6-0 Prolene.  Near completion of the suture line the shunt was removed and vessels reclamped. suture line was completed, usually deairing techniques were employed,  internal was flashed proximal and external clamps were removed, after 3 cardiac cycles the internal clamp was removed.  A 23-gauge butterfly needle was inserted in the proximal patch. carotid cerebral arteriogram was performed demonstrating patent internal and external carotid arteries brisk flow into the intracranial vessels, no evidence of obstruction, extravasation or dissection. needle was removed and puncture site was repaired with 7-0 Prolene.  Single 6-0 Prolene repair stitch and the suture line.  20 mg of protamine was given with return of ACT to baseline. hemostasis was obtained. 15 [place] Med drain was placed in the wound bed and exiting the base of the neck. incision closed in layers of 3-0 Vicryl in the platysma, 4-0 Monocryl in the skin with Dermabond tape.  Awoke from anesthesia, moving all extremities, no focal deficits. patient tolerated procedure well and transferred to PACU in stable condition.          This document has been electronically signed by Johnathan Randle MD on April 13, 2018 3:20 PM

## 2018-04-13 NOTE — ANESTHESIA PROCEDURE NOTES
Airway  Urgency: elective    Airway not difficult    General Information and Staff    Patient location during procedure: OR    Indications and Patient Condition  Indications for airway management: airway protection    Preoxygenated: yes  MILS maintained throughout  Mask difficulty assessment: 0 - not attempted    Final Airway Details  Final airway type: endotracheal airway      Successful airway: ETT  Cuffed: yes   Successful intubation technique: direct laryngoscopy  Facilitating devices/methods: intubating stylet  Endotracheal tube insertion site: oral  Blade: Kelsi  Blade size: #3  ETT size: 7.0 mm  Cormack-Lehane Classification: grade I - full view of glottis  Placement verified by: chest auscultation and capnometry   Measured from: gums  ETT to gums (cm): 17  Number of attempts at approach: 1

## 2018-04-13 NOTE — ANESTHESIA PREPROCEDURE EVALUATION
Anesthesia Evaluation     Patient summary reviewed and Nursing notes reviewed   NPO Solid Status: > 8 hours  NPO Liquid Status: > 8 hours           Airway   Mallampati: II  TM distance: >3 FB  Neck ROM: full  possible difficult intubation  Dental    (+) edentulous    Pulmonary - negative pulmonary ROS and normal exam    breath sounds clear to auscultation  (-) not a smoker  Cardiovascular - normal exam    ECG reviewed  Patient on routine beta blocker and Beta blocker given within 24 hours of surgery  Rhythm: regular  Rate: normal    (+) hypertension poorly controlled, carotid bruits (Right carotid bruit.), hyperlipidemia,  carotid artery disease carotid bilateral  (-) murmur    ROS comment:   Sinus rhythm with premature atrial complexes  Otherwise normal ECG  No previous ECGs available  Confirmed by DMITRIY MOLINA, DALTON ([id number]),  CHRISTINE DOMINGUEZ ([id number]) on    Neuro/Psych  (+) TIA,     GI/Hepatic/Renal/Endo - negative ROS     Musculoskeletal (-) negative ROS    Abdominal    Substance History - negative use     OB/GYN negative ob/gyn ROS         Other - negative ROS                       Anesthesia Plan    ASA 4     general   (Discussed additional peripheral IV,arterial line and patient understands possible complications,risks and agrees.)  intravenous induction   Anesthetic plan and risks discussed with patient and child.

## 2018-04-13 NOTE — H&P (VIEW-ONLY)
3/26/2018    Irais Barker  12/14/1931    Chief Complaint:  Carotid Stenosis      HPI:      PCP:  Jordi Marcos, MADELIN  86 y.o. female with HTN(uncontrolled, increased risk stroke), Hyperlipidemia(stable, increased risk cardiovascular events), Carotid Stenosis(new, increased risk stroke), Vitamin D deficiency(stable, increased risk cardiovascular events).  never smoked.  Brief episodes moderate to severe slurred speech x3 lasting few minutes, 2015 and around thanksgiving. Bruit noted on exam with primary care. Vision changes, blurry with cornea and glaucoma.  History stroke mother, father.  No stroke amaurosis.  No MI claudication. No other associated signs, symptoms or modifying factors.     8/2015 CXR:  Mild LEFT lower lobe atelectasis  1/3/2018 Carotid Duplex:  RUDY 70-99% (261/65cm/s, ratio 2.7) antegrade vert.  LICA 50-69% (0-49% by SRU criteria) (114/27cm/s, ratio 1.1)  2/19/2018 CTA Carotids:  RUDY 70% with large ulcerated plaque, LICA mild plaque.    The following portions of the patient's history were reviewed and updated as appropriate: allergies, current medications, past family history, past medical history, past social history, past surgical history and problem list.  Recent images independently reviewed.  Available laboratory values reviewed.    PMH:  Past Medical History:   Diagnosis Date   • Arthropathy    • Degeneration of macula and posterior pole of retina    • Herpes zoster    • Hyperlipidemia    • Hypertension    • Osteopenia    • Restless legs        ALLERGIES:  Allergies   Allergen Reactions   • Naprosyn [Naproxen]          MEDICATIONS:    Current Outpatient Prescriptions:   •  amLODIPine (NORVASC) 5 MG tablet, Take 5 mg by mouth Daily., Disp: , Rfl:   •  aspirin 81 MG chewable tablet, Chew 81 mg Daily., Disp: , Rfl:   •  brimonidine-timolol (COMBIGAN) 0.2-0.5 % ophthalmic solution, 1 drop Every 12 (Twelve) Hours., Disp: , Rfl:   •  latanoprost (XALATAN) 0.005 % ophthalmic solution, 1 drop  Every Night., Disp: , Rfl:   •  metoprolol succinate XL (TOPROL-XL) 25 MG 24 hr tablet, Take 25 mg by mouth Daily., Disp: , Rfl:   •  pravastatin (PRAVACHOL) 40 MG tablet, Take 40 mg by mouth Daily., Disp: , Rfl:     Review of Systems   Review of Systems   Constitution: Negative for weakness, malaise/fatigue and weight loss.   HENT: Positive for hearing loss.    Eyes: Positive for blurred vision and visual disturbance.   Cardiovascular: Negative for chest pain, claudication and dyspnea on exertion.   Respiratory: Negative for cough and shortness of breath.    Skin: Negative for color change and poor wound healing.   Musculoskeletal: Positive for arthritis and joint pain.   Neurological: Negative for dizziness and numbness.       Physical Exam   Physical Exam   Constitutional: She is oriented to person, place, and time. She is active and cooperative. She does not appear ill. No distress.   HENT:   Right Ear: Hearing normal.   Left Ear: Hearing normal.   Nose: No nasal deformity. No epistaxis.   Mouth/Throat: She does not have dentures. Normal dentition.   Cardiovascular: Normal rate and regular rhythm.    No murmur heard.  Pulses:       Carotid pulses are 2+ on the right side with bruit, and 2+ on the left side.       Radial pulses are 2+ on the right side, and 2+ on the left side.        Dorsalis pedis pulses are 2+ on the right side, and 2+ on the left side.   Pulmonary/Chest: Effort normal and breath sounds normal.   Abdominal: Soft. She exhibits no distension and no mass. There is no tenderness.   Musculoskeletal: She exhibits no deformity.   Gait normal.    Neurological: She is alert and oriented to person, place, and time. She has normal strength.   Skin: Skin is warm and dry. No cyanosis or erythema. No pallor.   No venous staining   Psychiatric: She has a normal mood and affect. Her speech is normal. Judgment and thought content normal.     Results for SHIRA GILLETTE (MRN [id number]) as of 4/6/2018  09:22   Ref. Range 2/12/2018 15:16   Creatinine Latest Ref Range: 0.40 - 1.30 mg/dL 0.70   BUN Latest Ref Range: 8 - 25 mg/dL 15     ASSESSMENT:  Irais was seen today for carotid artery disease.    Diagnoses and all orders for this visit:    Bilateral carotid artery stenosis    Occlusion and stenosis of carotid artery  -     Case Request; Standing  -     Type and screen; Future  -     sodium chloride 0.9 % infusion; Infuse 100 mL/hr into a venous catheter Continuous.  -     ceFAZolin (ANCEF) 2 g in sodium chloride 0.9 % 100 mL IVPB; Infuse 2 g into a venous catheter 1 (One) Time.  -     Case Request    Essential hypertension    Mixed hyperlipidemia    Hemispheric carotid artery syndrome    Other orders  -     Inpatient Admission; Standing  -     Provide NPO Instructions to Patient; Future  -     Clorhexidine Skin Prep; Future  -     Obtain informed consent; Standing  -     Chlorhexidine 4%/Hibiclens Skin Prep; Standing  -     Insert Arterial Line; Standing  -     Place sequential compression device; Standing    PLAN:  Detailed discussion with Irais Barker regarding situation, options and plans.  severe, symptomatic carotid stenosis.  RUDY with large ulcerated soft plaque. Carotid intervention is advisable.  Increased risk for Stroke and cardiovascular complications.  Carotid  Endarterectomy is advisable.    Risks, including but not limited to:  Mortality, major morbidity 1%.  Stroke risk 3-5%.  bleeding, transfusion, infection, pulmonary, renal dysfunction, blood vessel and nerve injury.  Benefits:  reduction of stroke risk  Options: carotid endarterectomy, stenting and medical therapy discussed.   usually overnight stay in hospital if all well. Understands and wishes to proceed.    RIGHT Carotid endarterectomy with patch, completion arteriogram, radial arterial line,  Ancef. GEN  SDS 4/13/2018    Return after above studies complete  Recommended regular physical activity, progressive walking  program.  Continue current medications as directed.    Thank you for the opportunity to participate in this patient's care.    Copy to primary care provider.    EMR Dragon/Transcription disclaimer:   Much of this encounter note is an electronic transcription/translation of spoken language to printed text. The electronic translation of spoken language may permit erroneous, or at times, nonsensical words or phrases to be inadvertently transcribed; Although I have reviewed the note for such errors, some may still exist.

## 2018-04-13 NOTE — ANESTHESIA POSTPROCEDURE EVALUATION
Patient: Irais Barker    Procedure Summary     Date:  04/13/18 Room / Location:  [facility] OR  / [facility] OR    Anesthesia Start:  1255 Anesthesia Stop:  1543    Procedure:  RIGHT CAROTID ENDARTERECTOMY carotid cerebral arteriogram       (C-ARM#2) (Right Neck) Diagnosis:       Occlusion and stenosis of carotid artery      (Occlusion and stenosis of carotid artery [I65.29])    Surgeon:  Johnathan Randle MD Provider:  Stiven Espinoza MD    Anesthesia Type:  general ASA Status:  4          Anesthesia Type: general  Last vitals  BP   (!) 183/85 (04/13/18 0915)   Temp   97.5 °F (36.4 °C) (04/13/18 0915)   Pulse   75 (04/13/18 0915)   Resp   18 (04/13/18 0915)     SpO2   97 % (04/13/18 0915)     Post Anesthesia Care and Evaluation    Patient location during evaluation: PACU  Patient participation: complete - patient participated  Level of consciousness: awake and awake and alert  Pain management: adequate  Airway patency: patent  Anesthetic complications: No anesthetic complications  PONV Status: none  Cardiovascular status: acceptable  Respiratory status: acceptable  Hydration status: acceptable

## 2018-04-14 VITALS
WEIGHT: 125.9 LBS | SYSTOLIC BLOOD PRESSURE: 131 MMHG | TEMPERATURE: 97.1 F | OXYGEN SATURATION: 96 % | BODY MASS INDEX: 23.17 KG/M2 | HEART RATE: 72 BPM | DIASTOLIC BLOOD PRESSURE: 62 MMHG | HEIGHT: 62 IN | RESPIRATION RATE: 16 BRPM

## 2018-04-14 PROCEDURE — 94799 UNLISTED PULMONARY SVC/PX: CPT

## 2018-04-14 PROCEDURE — 99024 POSTOP FOLLOW-UP VISIT: CPT | Performed by: NURSE PRACTITIONER

## 2018-04-14 PROCEDURE — 25010000003 CEFAZOLIN PER 500 MG: Performed by: THORACIC SURGERY (CARDIOTHORACIC VASCULAR SURGERY)

## 2018-04-14 PROCEDURE — 94760 N-INVAS EAR/PLS OXIMETRY 1: CPT

## 2018-04-14 RX ORDER — HYDROCODONE BITARTRATE AND ACETAMINOPHEN 5; 325 MG/1; MG/1
1 TABLET ORAL EVERY 4 HOURS PRN
Qty: 30 TABLET | Refills: 0 | Status: SHIPPED | OUTPATIENT
Start: 2018-04-14 | End: 2018-04-23

## 2018-04-14 RX ADMIN — METOPROLOL SUCCINATE 25 MG: 25 TABLET, EXTENDED RELEASE ORAL at 09:51

## 2018-04-14 RX ADMIN — ALBUTEROL SULFATE 2.5 MG: 2.5 SOLUTION RESPIRATORY (INHALATION) at 00:28

## 2018-04-14 RX ADMIN — ASPIRIN 81 MG 81 MG: 81 TABLET ORAL at 09:51

## 2018-04-14 RX ADMIN — CEFAZOLIN SODIUM 2 G: 1 INJECTION, POWDER, FOR SOLUTION INTRAMUSCULAR; INTRAVENOUS at 06:30

## 2018-04-14 RX ADMIN — ALBUTEROL SULFATE 2.5 MG: 2.5 SOLUTION RESPIRATORY (INHALATION) at 07:36

## 2018-04-14 RX ADMIN — AMLODIPINE BESYLATE 5 MG: 5 TABLET ORAL at 09:51

## 2018-04-14 RX ADMIN — ATORVASTATIN CALCIUM 20 MG: 20 TABLET, FILM COATED ORAL at 09:51

## 2018-04-14 NOTE — DISCHARGE INSTR - APPOINTMENTS
MADELIN Dockery  Friday 4/20/18 at 1:00 pm      MADELIN Hernandez  Office will call with appt date and time for 1 wk follow-up

## 2018-04-14 NOTE — DISCHARGE SUMMARY
CVTS DISCHARGE SUMMARY  Date of Admission: 4/13/2018  8:41 AM  Date of Discharge:  4/14/2018    Admission Diagnosis:   Occlusion and stenosis of carotid artery [I65.29]    Discharge Diagnosis:   Post-Op Diagnosis Codes:     * Occlusion and stenosis of carotid artery [I65.29]    Consults:   Consults     No orders found for last 30 day(s).          Procedures Performed  Procedure(s):  RIGHT CAROTID ENDARTERECTOMY carotid cerebral arteriogram       (C-ARM#2)       Discharge Medications   Irais Barker   Home Medication Instructions BHARATH:[id number]    Printed on:04/14/18 0954   Medication Information                      amLODIPine (NORVASC) 5 MG tablet  Take 5 mg by mouth Daily.             aspirin 81 MG chewable tablet  Chew 81 mg Daily.             brimonidine-timolol (COMBIGAN) 0.2-0.5 % ophthalmic solution  Administer 1 drop to both eyes Every 12 (Twelve) Hours.             HYDROcodone-acetaminophen (NORCO) 5-325 MG per tablet  Take 1 tablet by mouth Every 4 (Four) Hours As Needed for Moderate Pain  for up to 9 days.             latanoprost (XALATAN) 0.005 % ophthalmic solution  Administer 1 drop to both eyes Every Night.             metoprolol succinate XL (TOPROL-XL) 25 MG 24 hr tablet  Take 25 mg by mouth Daily.             pravastatin (PRAVACHOL) 40 MG tablet  Take 40 mg by mouth Daily.               Medical Management: ASA,STATIN Reviewed risks, benefits, and habit forming potential and weaning from narcotic medication. Patient understands and wishes to receive prescription.  Prescription written for Norco #30 for post-surgical pain after Brad placed on file.    Discharge Diet:   Diet Instructions     Diet: Cardiac       Discharge Diet:  Cardiac          Discharge Disposition: Home in stable condition with follow up appointments with CVTS Nurse Practitioner 1 week, Dr. Randle/Ehsan 6 weeks, Cardiology/PCP as scheduled.     History of Present Illness/Hospital Course:     86 y.o. female with  HTN(uncontrolled, increased risk stroke), Hyperlipidemia(stable, increased risk cardiovascular events), Carotid Stenosis(new, increased risk stroke), Vitamin D deficiency(stable, increased risk cardiovascular events).  never smoked.  Brief episodes moderate to severe slurred speech x3 lasting few minutes, 2015 and around thanksgiving. Bruit noted on exam with primary care. Vision changes, blurry with cornea and glaucoma.  History stroke mother, father.  No stroke amaurosis.  No MI claudication. No other associated signs, symptoms or modifying factors.     8/2015 CXR:  Mild LEFT lower lobe atelectasis  1/3/2018 Carotid Duplex:  RUDY 70-99% (261/65cm/s, ratio 2.7) antegrade vert. LICA 50-69% (0-49% by SRU criteria) (114/27cm/s, ratio 1.1)  2/19/2018 CTA Carotids:  RUDY 70% with large ulcerated plaque, LICA mild plaque.    Adequate preop studies were completed and the patient was scheduled electively. Operative day Irais Barker admitted through [facility] [place], taken to operating suite and placed under general anesthesia.  Underwent said procedure without complications or difficulty. They were weaned easily from mechanical ventilation and extubated in the recovery room placed on oxygen per nasal cannula and further transferred toStepdown for further care and monitoring. Irais Barker remained hemodynamically and neurovascularly stable immediately postop.  POD1 they were out of the bed to the chair tolerating breakfast without any difficulty swallowing.  Drains were removed, Incisions and vital signs are stable for dc home today in satisfactory condition.           Vital Signs  Temp:  [96.5 °F (35.8 °C)-97.5 °F (36.4 °C)] 96.5 °F (35.8 °C)  Heart Rate:  [59-77] 72  Resp:  [16-20] 16  BP: (106-144)/(52-74) 118/56  Arterial Line BP: (102-145)/(44-72) 102/72  1    04/13/18  0915 04/14/18  0321   Weight: 51.3 kg (113 lb 1.5 oz) 57.1 kg (125 lb 14.4 oz)       Pertinent Test Results:  Lab Results   Component Value Date     "WBC 5.99 04/10/2018    HGB 13.9 04/10/2018    HCT 40.0 04/10/2018    MCV 90.3 04/10/2018     04/10/2018     Lab Results   Component Value Date    GLUCOSE 102 (H) 04/10/2018    BUN 15 04/10/2018    CREATININE 0.65 04/10/2018    EGFRIFNONA 86 04/10/2018    BCR 23.1 04/10/2018    K 4.3 04/10/2018    CO2 27.0 04/10/2018    CALCIUM 10.0 04/10/2018    ALBUMIN 4.3 02/03/2015    AST 20 02/03/2015    ALT 16 02/03/2015       Physical Exam:  Physical Exam   General Appearance:  Comfortable.    Vital signs: (most recent): Blood pressure 118/56, pulse 72, temperature 96.5 °F (35.8 °C), temperature source Axillary, resp. rate 16, height 157.5 cm (62\"), weight 57.1 kg (125 lb 14.4 oz), SpO2 96 %.  Vital signs are normal.  No fever.    Output: Producing urine and no stool output (flatus).    HEENT: Normal HEENT exam.    Lungs:  Normal effort and normal respiratory rate.  Breath sounds clear to auscultation.    Heart: Normal rate.    Abdomen: Abdomen is soft and non-distended.  Bowel sounds are normal.   There is no abdominal tenderness.     Extremities: Normal range of motion.  (Normal gait)  Pulses: Distal pulses are intact.    Neurological: Patient is alert and oriented to person, place and time.  (Smile Symmetrical  Tongue Midline   equal  ).    Pupils:  Pupils are equal, round, and reactive to light.    Skin:  Warm and dry.  ((R) Neck Inc: Prineo intact  FP Dc'd)    Additional Instructions for the Follow-ups that You Need to Schedule     Discharge Follow-up with Specialty: Vascular Surgery; 1 Week    As directed      Specialty:  Vascular Surgery    Follow Up:  1 Week    Follow Up Details:  Zoie Chavez APRN. Friday 4/20/18 1:00               Discharge Instructions: Discharge instructions include no heavy lifting anything greater than 10lbs for approximately 1 week.  No sex or driving for 1-2 weeks. Printed information given to the patient with advancement of activities weekly.  Risks and benefits of narcotic " medications and weaning postoperatively have been discussed. Clean operative site with antibacterial soap/water, pat dry. Keep open to air unless draining, then may apply dry dressing.  No ointments or creams unless prescribed by provider. Signs and symptoms of infection including drainage from operative site, redness, swelling, with associated fever and/or chills notify Heart and Vascular center immediately for wound check. If you should experience any neurological symptoms including but not limited to visual or speech disturbances confusion, seizures, or weakness of limbs of one side of your body notify Heart and Vascular center immediately for evaluation or if after hours present to the nearest Emergency Department.  Patient verbalizes understanding of discharge instructions, all questions are answered, follow up appointments have been made, they are discharged home in stable condition.           Follow-up Appointments  No future appointments.    Test Results Pending at Discharge   Order Current Status    Tissue Pathology Exam Collected (04/13/18 1424)                 This document has been electronically signed by MADELIN Bautista on April 14, 2018 9:54 AM      Time: Discharge 60 min

## 2018-04-14 NOTE — PLAN OF CARE
Problem: Patient Care Overview  Goal: Plan of Care Review  Outcome: Outcome(s) achieved Date Met: 04/14/18 04/14/18 0453   Coping/Psychosocial   Plan of Care Reviewed With patient   Plan of Care Review   Progress improving   OTHER   Outcome Summary pt vss, no c/o pain, dsg c/d/i, beyer cath to drainage bag at bedside     Goal: Individualization and Mutuality  Outcome: Ongoing (interventions implemented as appropriate)      Problem: Cardiac Surgery (Adult)  Goal: Signs and Symptoms of Listed Potential Problems Will be Absent, Minimized or Managed (Cardiac Surgery)  Outcome: Ongoing (interventions implemented as appropriate)   04/14/18 0453   Goal/Outcome Evaluation   Problems Assessed (Cardiac Surgery) all   Problems Present (Cardiac Surgery) none

## 2018-04-17 LAB
LAB AP CASE REPORT: NORMAL
Lab: NORMAL
PATH REPORT.FINAL DX SPEC: NORMAL
PATH REPORT.GROSS SPEC: NORMAL

## 2018-04-20 ENCOUNTER — OFFICE VISIT (OUTPATIENT)
Dept: CARDIAC SURGERY | Facility: CLINIC | Age: 83
End: 2018-04-20

## 2018-04-20 VITALS
HEART RATE: 85 BPM | WEIGHT: 118 LBS | SYSTOLIC BLOOD PRESSURE: 128 MMHG | OXYGEN SATURATION: 99 % | TEMPERATURE: 96.8 F | DIASTOLIC BLOOD PRESSURE: 70 MMHG | BODY MASS INDEX: 21.71 KG/M2 | HEIGHT: 62 IN

## 2018-04-20 DIAGNOSIS — I65.23 BILATERAL CAROTID ARTERY STENOSIS: ICD-10-CM

## 2018-04-20 DIAGNOSIS — Z09 FOLLOW-UP SURGERY CARE: Primary | ICD-10-CM

## 2018-04-20 PROCEDURE — 99024 POSTOP FOLLOW-UP VISIT: CPT | Performed by: NURSE PRACTITIONER

## 2018-04-20 NOTE — PATIENT INSTRUCTIONS
Stable Post Op Carotid: Progressing well  Medical Management: ASA,STATIN   If you should experience any neurological symptoms including but not limited to visual or speech disturbances confusion, seizures, or weakness of limbs of one side of your body notify Heart and Vascular center immediately for evaluation or if after hours present to the nearest Emergency Department.    Return 6 weeks - RIGHT Carotid Duplex    OK to drive  Clean operative site with antibacterial soap/water, pat dry. Keep open to air unless draining, then may apply dry dressing.  No ointments or creams unless prescribed by provider.   Signs and symptoms of infection including drainage from operative site, redness, swelling, with associated fever and/or chills notify Heart and Vascular center immediately for wound check.

## 2018-04-23 NOTE — PROGRESS NOTES
Subjective   Patient ID: Irais Barker is a 86 y.o. female is here today for follow-up.    Chief Complaint:    Chief Complaint   Patient presents with   • Carotid Artery Disease     f/u [facility] 4/13/18        History of Present Illness  The following portions of the patient's history were reviewed and updated as appropriate: allergies, current medications, past family history, past medical history, past social history, past surgical history and problem list.  Recent images independently reviewed.  Available laboratory values reviewed.  PCP:  MADELIN Hernandez  86 y.o. female with HTN(uncontrolled, increased risk stroke), Hyperlipidemia(stable, increased risk cardiovascular events), Carotid Stenosis(new, increased risk stroke), Vitamin D deficiency(stable, increased risk cardiovascular events).  never smoked.  Brief episodes moderate to severe slurred speech x3 lasting few minutes, 2015 and around thanksgiving. Bruit noted on exam with primary care. Vision changes, blurry with cornea and glaucoma.  History stroke mother, father.  No stroke amaurosis.  No MI claudication. No other associated signs, symptoms or modifying factors. Returns for follow up post CEA. Progressing well.      8/2015 CXR:  Mild LEFT lower lobe atelectasis  1/3/2018 Carotid Duplex:  RUDY 70-99% (261/65cm/s, ratio 2.7) antegrade vert.  LICA 50-69% (0-49% by SRU criteria) (114/27cm/s, ratio 1.1)  2/19/2018 CTA Carotids:  RUDY 70% with large ulcerated plaque, LICA mild plaque.  4/13/18: RIGHT CEA      Past Medical History:   Diagnosis Date   • Arthropathy    • Degeneration of macula and posterior pole of retina    • Herpes zoster    • Hyperlipidemia    • Hypertension    • Osteopenia    • Restless legs    • TIA (transient ischemic attack)      Past Surgical History:   Procedure Laterality Date   • APPENDECTOMY     • BREAST BIOPSY     • CAROTID ENDARTERECTOMY Right 4/13/2018    Procedure: RIGHT CAROTID ENDARTERECTOMY carotid cerebral arteriogram        (C-ARM#2);  Surgeon: Johnathan Randle MD;  Location: [facility];  Service: Vascular   • EYE SURGERY Bilateral     CATARACTS AND IMPLANTS   • HYSTERECTOMY     • INJECTION OF MEDICATION  11/14/2014    Depo Medrol (Methylprednisone) 80mg (1)    • TONSILLECTOMY         ALLERGIES:   Adhesive tape; Naprosyn [naproxen]; and Other    MEDICATIONS:      Current Outpatient Prescriptions:   •  amLODIPine (NORVASC) 5 MG tablet, Take 5 mg by mouth Daily., Disp: , Rfl:   •  aspirin 81 MG chewable tablet, Chew 81 mg Daily., Disp: , Rfl:   •  brimonidine-timolol (COMBIGAN) 0.2-0.5 % ophthalmic solution, Administer 1 drop to both eyes Every 12 (Twelve) Hours., Disp: , Rfl:   •  latanoprost (XALATAN) 0.005 % ophthalmic solution, Administer 1 drop to both eyes Every Night., Disp: , Rfl:   •  metoprolol succinate XL (TOPROL-XL) 25 MG 24 hr tablet, Take 25 mg by mouth Daily., Disp: , Rfl:   •  pravastatin (PRAVACHOL) 40 MG tablet, Take 40 mg by mouth Daily., Disp: , Rfl:   •  HYDROcodone-acetaminophen (NORCO) 5-325 MG per tablet, Take 1 tablet by mouth Every 4 (Four) Hours As Needed for Moderate Pain  for up to 9 days., Disp: 30 tablet, Rfl: 0    Review of Systems   Constitution: Negative for fever and weakness.   HENT: Positive for hearing loss.    Eyes: Negative for visual disturbance.   Cardiovascular: Negative for claudication, cyanosis and leg swelling.   Respiratory: Negative for shortness of breath.    Skin: Negative for color change, nail changes and poor wound healing.   Musculoskeletal: Positive for arthritis and back pain. Negative for muscle weakness.   Gastrointestinal: Negative for change in bowel habit, constipation and dysphagia.   Genitourinary: Negative for dysuria.   Neurological: Negative for focal weakness, light-headedness, loss of balance, numbness and paresthesias.   Psychiatric/Behavioral: Negative for altered mental status.        Objective      Body mass index is 21.58 kg/m².  Physical Exam    Constitutional: She is oriented to person, place, and time. She appears well-developed.   HENT:   Head: Normocephalic.   Mouth/Throat: Oropharynx is clear and moist.   (R) droop at rest. Smile symmetrical.   Eyes: EOM are normal.   Neck: Normal range of motion. Neck supple.   Cardiovascular: Normal rate and intact distal pulses.    Pulmonary/Chest: Effort normal and breath sounds normal. No respiratory distress.   Abdominal: Soft. Bowel sounds are normal.   Musculoskeletal: Normal range of motion. She exhibits no edema.   Neurological: She is alert and oriented to person, place, and time. No cranial nerve deficit. Coordination normal.   Tongue Midline   equal     Skin: Skin is warm and dry.   (R) Neck Inc: CDI, Prineo Intact. Well approximated   Psychiatric: She has a normal mood and affect. Thought content normal.   Vitals reviewed.          Assessment/Plan   Independent Review of Radiographic Studies:    Detailed discussion regarding risks, benefits, and treatment plan. Images independently reviewed. Patient understands, agrees, and wishes to proceed with plan.     1. Follow-up surgery care  OK to drive  Clean operative site with antibacterial soap/water, pat dry. Keep open to air unless draining, then may apply dry dressing.  No ointments or creams unless prescribed by provider.   Signs and symptoms of infection including drainage from operative site, redness, swelling, with associated fever and/or chills notify Heart and Vascular center immediately for wound check.      2. Bilateral carotid artery stenosis  Stable Post Op Carotid: Progressing well  Medical Management: ASA,STATIN   If you should experience any neurological symptoms including but not limited to visual or speech disturbances confusion, seizures, or weakness of limbs of one side of your body notify Heart and Vascular center immediately for evaluation or if after hours present to the nearest Emergency Department.    Return 6 weeks - RIGHT  Carotid Duplex  - Duplex Carotid - Right Ultrasound CAR; Future            This document has been electronically signed by MADELIN Bautista on April 23, 2018 8:43 AM

## 2018-04-24 NOTE — ANESTHESIA PROCEDURE NOTES
Arterial Line    Patient location during procedure: OR  Start time: 4/13/2018 1:01 PM  Stop Time:4/13/2018 1:07 PM       Line placed for hemodynamic monitoring.  Performed By   CRNA: ERINN URBAN  Preanesthetic Checklist  Completed: patient identified and risks and benefits discussed  Arterial Line Prep   Sterile Tech: gloves  Prep: ChloraPrep  Patient monitoring: blood pressure monitoring, continuous pulse oximetry and EKG  Arterial Line Procedure   Laterality:left  Location:  radial artery  Catheter size: 20 G   Guidance: landmark technique and palpation technique  Successful placement: yes          Post Assessment   Dressing Type: occlusive dressing applied and secured with tape.   Complications no  Patient Tolerance: patient tolerated the procedure well with no apparent complications

## 2018-04-24 NOTE — ADDENDUM NOTE
Addendum  created 04/24/18 1350 by Johnathan Brock MD    Anesthesia Intra Blocks edited, Child order released for a procedure order, LDA created via procedure documentation, Sign clinical note

## 2018-05-24 ENCOUNTER — OFFICE VISIT (OUTPATIENT)
Dept: CARDIAC SURGERY | Facility: CLINIC | Age: 83
End: 2018-05-24

## 2018-05-24 VITALS
HEIGHT: 62 IN | BODY MASS INDEX: 20.74 KG/M2 | HEART RATE: 68 BPM | WEIGHT: 112.7 LBS | SYSTOLIC BLOOD PRESSURE: 151 MMHG | DIASTOLIC BLOOD PRESSURE: 74 MMHG | OXYGEN SATURATION: 96 % | TEMPERATURE: 97.7 F

## 2018-05-24 DIAGNOSIS — I65.23 BILATERAL CAROTID ARTERY STENOSIS: ICD-10-CM

## 2018-05-24 DIAGNOSIS — Z09 FOLLOW-UP SURGERY CARE: Primary | ICD-10-CM

## 2018-05-24 PROCEDURE — 99024 POSTOP FOLLOW-UP VISIT: CPT | Performed by: NURSE PRACTITIONER

## 2018-06-11 NOTE — PROGRESS NOTES
Subjective   Patient ID: Irais Barker is a 86 y.o. female is here today for follow-up.    Chief Complaint:    Chief Complaint   Patient presents with   • Carotid Artery Disease     6 wk f/u, R CEA, 4/13/2018       History of Present Illness  The following portions of the patient's history were reviewed and updated as appropriate: allergies, current medications, past family history, past medical history, past social history, past surgical history and problem list.  Recent images independently reviewed.  Available laboratory values reviewed.    PCP:  MADELIN Hernandez  86 y.o. female with HTN(uncontrolled, increased risk stroke), Hyperlipidemia(stable, increased risk cardiovascular events), Carotid Stenosis(new, increased risk stroke), Vitamin D deficiency(stable, increased risk cardiovascular events).  never smoked.  Brief episodes moderate to severe slurred speech x3 lasting few minutes, 2015 and around thanksgiving. Bruit noted on exam with primary care. Vision changes, blurry with cornea and glaucoma.  History stroke mother, father.  No stroke amaurosis.  No MI claudication. No other associated signs, symptoms or modifying factors. Returns for follow up. Progressing well.      8/2015 CXR:  Mild LEFT lower lobe atelectasis  1/3/2018 Carotid Duplex:  RUDY 70-99% (261/65cm/s, ratio 2.7) antegrade vert.  LICA 50-69% (0-49% by SRU criteria) (114/27cm/s, ratio 1.1)  2/19/2018 CTA Carotids:  RUDY 70% with large ulcerated plaque, LICA mild plaque.  4/13/18: RIGHT CEA  5/24/18: Carotid Duplex: RUDY No Stenosis Antegrade Flow      Past Medical History:   Diagnosis Date   • Arthropathy    • Degeneration of macula and posterior pole of retina    • Herpes zoster    • Hyperlipidemia    • Hypertension    • Osteopenia    • Restless legs    • TIA (transient ischemic attack)      Past Surgical History:   Procedure Laterality Date   • APPENDECTOMY     • BREAST BIOPSY     • CAROTID ENDARTERECTOMY Right 4/13/2018    Procedure:  RIGHT CAROTID ENDARTERECTOMY carotid cerebral arteriogram       (C-ARM#2);  Surgeon: Johnathan Randle MD;  Location: [facility];  Service: Vascular   • EYE SURGERY Bilateral     CATARACTS AND IMPLANTS   • HYSTERECTOMY     • INJECTION OF MEDICATION  11/14/2014    Depo Medrol (Methylprednisone) 80mg (1)    • TONSILLECTOMY         ALLERGIES:   Adhesive tape; Naprosyn [naproxen]; and Other    MEDICATIONS:      Current Outpatient Prescriptions:   •  amLODIPine (NORVASC) 5 MG tablet, Take 5 mg by mouth Daily., Disp: , Rfl:   •  aspirin 81 MG chewable tablet, Chew 81 mg Daily., Disp: , Rfl:   •  brimonidine-timolol (COMBIGAN) 0.2-0.5 % ophthalmic solution, Administer 1 drop to both eyes Every 12 (Twelve) Hours., Disp: , Rfl:   •  latanoprost (XALATAN) 0.005 % ophthalmic solution, Administer 1 drop to both eyes Every Night., Disp: , Rfl:   •  metoprolol succinate XL (TOPROL-XL) 25 MG 24 hr tablet, Take 25 mg by mouth Daily., Disp: , Rfl:   •  pravastatin (PRAVACHOL) 40 MG tablet, Take 40 mg by mouth Daily., Disp: , Rfl:     Review of Systems   HENT: Positive for hearing loss.    Eyes: Negative for visual disturbance.   Cardiovascular: Negative for claudication, cyanosis and leg swelling.   Respiratory: Negative for shortness of breath.    Skin: Negative for color change, nail changes and poor wound healing.   Musculoskeletal: Positive for arthritis and back pain. Negative for muscle weakness.   Gastrointestinal: Negative for constipation and dysphagia.   Genitourinary: Negative for dysuria.   Neurological: Negative for focal weakness, light-headedness, loss of balance and paresthesias.   Psychiatric/Behavioral: Negative for altered mental status.        Objective       Vitals:    05/24/18 1123   BP: 151/74   Pulse: 68   Temp: 97.7 °F (36.5 °C)   SpO2: 96%       Body mass index is 20.61 kg/m².  Physical Exam   Constitutional: She is oriented to person, place, and time. She appears well-developed.   HENT:   Head:  Normocephalic.   Mouth/Throat: Oropharynx is clear and moist.   (R) droop at rest. - improved  Smile symmetrical.   Eyes: EOM are normal.   Neck: Normal range of motion. Neck supple.   Cardiovascular: Normal rate and intact distal pulses.    Pulmonary/Chest: Effort normal and breath sounds normal. No respiratory distress.   Abdominal: Soft. Bowel sounds are normal.   Musculoskeletal: Normal range of motion. She exhibits no edema.   Neurological: She is alert and oriented to person, place, and time. No cranial nerve deficit. Coordination normal.   Tongue Midline   equal     Skin: Skin is warm and dry.   (R) Neck Inc: Healed   Psychiatric: She has a normal mood and affect. Thought content normal.   Vitals reviewed.          Assessment/Plan   Independent Review of Radiographic Studies:    Detailed discussion regarding risks, benefits, and treatment plan. Images independently reviewed. Patient understands, agrees, and wishes to proceed with plan.     1. Follow-up surgery care  Stable Post Op Carotid-RIGHT  Progressing well    Return 6 mos- Carotid Duplex    2. Bilateral carotid artery stenosis  Medical Management: ASA,STATIN   If you should experience any neurological symptoms including but not limited to visual or speech disturbances confusion, seizures, or weakness of limbs of one side of your body notify [facility] center immediately for evaluation or if after hours present to the nearest Emergency Department.  - Duplex Carotid Ultrasound CAR; Future              This document has been electronically signed by MADELIN Bautista on June 11, 2018 9:54 AM

## 2019-01-15 ENCOUNTER — OFFICE VISIT (OUTPATIENT)
Dept: CARDIAC SURGERY | Facility: CLINIC | Age: 84
End: 2019-01-15

## 2019-01-15 VITALS
BODY MASS INDEX: 19.12 KG/M2 | HEART RATE: 71 BPM | DIASTOLIC BLOOD PRESSURE: 70 MMHG | HEIGHT: 64 IN | OXYGEN SATURATION: 98 % | WEIGHT: 112 LBS | SYSTOLIC BLOOD PRESSURE: 165 MMHG | TEMPERATURE: 98.2 F

## 2019-01-15 DIAGNOSIS — I65.23 BILATERAL CAROTID ARTERY STENOSIS: Primary | ICD-10-CM

## 2019-01-15 PROCEDURE — 99213 OFFICE O/P EST LOW 20 MIN: CPT | Performed by: NURSE PRACTITIONER

## 2019-01-15 NOTE — PATIENT INSTRUCTIONS
NORMAL Carotid Artery Stenosis right remained stable   mild Carotid Artery Stenosis left remained stable Asymptomatic  Medical Management: ASA,STATIN  If you should experience any neurological symptoms including but not limited to visual or speech disturbances confusion, seizures, or weakness of limbs of one side of your body notify [facility] V[facility] center immediately for evaluation or if after hours present to the nearest Emergency Department.    Return 1 year- Carotid Duplex

## 2019-01-18 NOTE — PROGRESS NOTES
Subjective   Patient ID: Irais Barker is a 87 y.o. female is here today for follow-up.    Chief Complaint:    Chief Complaint   Patient presents with   • Carotid Artery Disease     6 month follow up        History of Present Illness  The following portions of the patient's history were reviewed and updated as appropriate: allergies, current medications, past family history, past medical history, past social history, past surgical history and problem list.  Recent images independently reviewed.  Available laboratory values reviewed.    PCP:  MADELIN Hernandez  87 y.o. female with HTN(uncontrolled, increased risk stroke), Hyperlipidemia(stable, increased risk cardiovascular events), Carotid Stenosis(new, increased risk stroke), Vitamin D deficiency(stable, [id number] risk cardiovascular events).  never smoked.  Brief episodes moderate to severe slurred speech x3 lasting few minutes, 2015 and around thanksgiving. Bruit noted on exam with primary care. Vision changes, blurry with cornea and glaucoma.  History stroke mother, father.  No stroke amaurosis.  No MI claudication. No other associated signs, symptoms or modifying factors. Returns for follow up. Progressing well.      8/2015 CXR:  Mild LEFT lower lobe atelectasis  1/3/2018 Carotid Duplex:  RUDY 70-99% (261/65cm/s, ratio 2.7) antegrade vert.  LICA 50-69% (0-49% by SRU criteria) (114/27cm/s, ratio 1.1)  2/19/2018 CTA Carotids:  RUDY 70% with large ulcerated plaque, LICA mild plaque.  4/13/18: RIGHT CEA  5/24/18: Carotid Duplex: RUDY No Stenosis Antegrade Flow  1/15/19: Carotid Duplex: RUDY 0-49% (91cm/s) LICA 0-49% (108cm/s) antegrade      Past Medical History:   Diagnosis Date   • Arthropathy    • Degeneration of macula and posterior pole of retina    • Herpes zoster    • Hyperlipidemia    • Hypertension    • Osteopenia    • Restless legs    • TIA (transient ischemic attack)      Past Surgical History:   Procedure Laterality Date   • APPENDECTOMY     •  BREAST BIOPSY     • CAROTID ENDARTERECTOMY Right 4/13/2018    Procedure: RIGHT CAROTID ENDARTERECTOMY carotid cerebral arteriogram       (C-ARM#2);  Surgeon: Johnathan Randle MD;  Location: [facility];  Service: Vascular   • EYE SURGERY Bilateral     CATARACTS AND IMPLANTS   • HYSTERECTOMY     • INJECTION OF MEDICATION  11/14/2014    Depo Medrol (Methylprednisone) 80mg (1)    • TONSILLECTOMY         ALLERGIES:   Adhesive tape; Naprosyn [naproxen]; and Other    MEDICATIONS:      Current Outpatient Medications:   •  amLODIPine (NORVASC) 5 MG tablet, Take 5 mg by mouth Daily., Disp: , Rfl:   •  aspirin 81 MG chewable tablet, Chew 81 mg Daily., Disp: , Rfl:   •  brimonidine-timolol (COMBIGAN) 0.2-0.5 % ophthalmic solution, Administer 1 drop to both eyes Every 12 (Twelve) Hours., Disp: , Rfl:   •  latanoprost (XALATAN) 0.005 % ophthalmic solution, Administer 1 drop to both eyes Every Night., Disp: , Rfl:   •  metoprolol succinate XL (TOPROL-XL) 25 MG 24 hr tablet, Take 25 mg by mouth Daily., Disp: , Rfl:   •  pravastatin (PRAVACHOL) 40 MG tablet, Take 40 mg by mouth Daily., Disp: , Rfl:     Review of Systems   HENT: Positive for hearing loss.    Eyes: Negative for visual disturbance.   Cardiovascular: Negative for claudication, cyanosis and leg swelling.   Respiratory: Negative for shortness of breath.    Skin: Negative for color change, nail changes and poor wound healing.   Musculoskeletal: Positive for arthritis and back pain. Negative for muscle weakness.   Gastrointestinal: Negative for constipation and dysphagia.   Genitourinary: Negative for dysuria.   Neurological: Negative for focal weakness, light-headedness, loss of balance and paresthesias.   Psychiatric/Behavioral: Negative for altered mental status.        Objective       Vitals:    01/15/19 1318   BP: 165/70   Pulse: 71   Temp: 98.2 °F (36.8 °C)   SpO2: 98%       Body mass index is 19.22 kg/m².  Physical Exam   Constitutional: She is oriented to  person, place, and time. She appears well-developed.   HENT:   Head: Normocephalic.   Mouth/Throat: Oropharynx is clear and moist.   Smile symmetrical.   Eyes: EOM are normal.   Neck: Normal range of motion. Neck supple.   Cardiovascular: Normal rate and intact distal pulses.   Pulmonary/Chest: Effort normal and breath sounds normal. No respiratory distress.   Abdominal: Soft. Bowel sounds are normal.   Musculoskeletal: Normal range of motion. She exhibits no edema.   Neurological: She is alert and oriented to person, place, and time. No cranial nerve deficit. Coordination normal.   Tongue Midline   equal     Skin: Skin is warm and dry.   Psychiatric: She has a normal mood and affect. Thought content normal.   Vitals reviewed.          Assessment/Plan   Independent Review of Radiographic Studies:    Detailed discussion regarding risks, benefits, and treatment plan. Images independently reviewed. Patient understands, agrees, and wishes to proceed with plan.     1. Bilateral carotid artery stenosis  NORMAL Carotid Artery Stenosis right remained stable   mild Carotid Artery Stenosis left remained stable Asymptomatic  Medical Management: ASA,STATIN  If you should experience any neurological symptoms including but not limited to visual or speech disturbances confusion, seizures, or weakness of limbs of one side of your body notify [facility] center immediately for evaluation or if after hours present to the nearest Emergency Department.    Return 1 year- Carotid Duplex   - Duplex Carotid Ultrasound CAR; Future              This document has been electronically signed by MADELIN Bautista on January 18, 2019 9:56 AM

## (undated) DEVICE — SYR LUERLOK 20CC

## (undated) DEVICE — RESERVOIR,SUCTION,100CC,SILICONE: Brand: MEDLINE

## (undated) DEVICE — Device

## (undated) DEVICE — NDL HYPO PRECISIONGLIDE/REG 18G 1IN PNK

## (undated) DEVICE — DRSNG TELFA PAD NONADH STR 1S 3X4IN

## (undated) DEVICE — SUT SILK 0 TIES 18IN A186H BX36

## (undated) DEVICE — SPNG DRN AMD EXCILON 6PLY 4X4IN PK/2

## (undated) DEVICE — SYR LL 3CC

## (undated) DEVICE — STERILE POLYISOPRENE POWDER-FREE SURGICAL GLOVES WITH EMOLLIENT COATING: Brand: PROTEXIS

## (undated) DEVICE — GLV SURG TRIUMPH LT PF LTX 6 STRL

## (undated) DEVICE — SUT SILK 2/0 FS BLK 18IN 685G

## (undated) DEVICE — SUNDT™ INTERNAL CAROTID ENDARTERECTOMY SHUNT: Brand: SUNDT™

## (undated) DEVICE — ELECTRD BLD EZ CLN MOD 2.5IN

## (undated) DEVICE — SYR LL TP 10ML STRL

## (undated) DEVICE — SOL IRR NACL 0.9PCT BT 1000ML

## (undated) DEVICE — SUT PROLN CARDIO BV1 6/0 24IN 8805H

## (undated) DEVICE — STPLR SKIN VISISTAT WD 35CT

## (undated) DEVICE — SHEET,DRAPE,53X77,STERILE: Brand: MEDLINE

## (undated) DEVICE — SUT VICRYL 4/0 SUTUPAK 18 J109T

## (undated) DEVICE — PK VASC LF 60

## (undated) DEVICE — GLV SURG SENSICARE GREEN W/ALOE PF LF 7 STRL

## (undated) DEVICE — TP MEFIX 4IN 11YD

## (undated) DEVICE — GLV SURG SENSICARE GREEN W/ALOE PF LF 6 STRL

## (undated) DEVICE — SUT PROLENE 7-0 BV175-7 24IN DB ETH8766H

## (undated) DEVICE — TBG HI PRESSURE 500PSI 20IN

## (undated) DEVICE — Device: Brand: JELCO

## (undated) DEVICE — CVR SURG EQUIP BND RECTG 36X28

## (undated) DEVICE — SYS SKIN CLS DERMABOND PRINEO W/22CM MESH TP

## (undated) DEVICE — SUT VICRYL 3-0 SH-1 PO 18IN J772D

## (undated) DEVICE — GOWN,NON-REINFORCED,4XL: Brand: MEDLINE

## (undated) DEVICE — NDL HYPO PRECISIONGLIDE/REG 30G 1/2 BRN